# Patient Record
Sex: FEMALE | Race: WHITE | Employment: FULL TIME | URBAN - METROPOLITAN AREA
[De-identification: names, ages, dates, MRNs, and addresses within clinical notes are randomized per-mention and may not be internally consistent; named-entity substitution may affect disease eponyms.]

---

## 2024-01-23 ENCOUNTER — OFFICE VISIT (OUTPATIENT)
Dept: OBGYN CLINIC | Facility: CLINIC | Age: 29
End: 2024-01-23
Payer: COMMERCIAL

## 2024-01-23 VITALS
BODY MASS INDEX: 31.18 KG/M2 | DIASTOLIC BLOOD PRESSURE: 62 MMHG | SYSTOLIC BLOOD PRESSURE: 112 MMHG | HEIGHT: 67 IN | WEIGHT: 198.63 LBS

## 2024-01-23 DIAGNOSIS — Z01.419 WOMEN'S ANNUAL ROUTINE GYNECOLOGICAL EXAMINATION: ICD-10-CM

## 2024-01-23 DIAGNOSIS — N94.6 DYSMENORRHEA: ICD-10-CM

## 2024-01-23 DIAGNOSIS — R68.82 LOW LIBIDO: ICD-10-CM

## 2024-01-23 DIAGNOSIS — Z12.4 ROUTINE CERVICAL SMEAR: Primary | ICD-10-CM

## 2024-01-23 PROCEDURE — 99385 PREV VISIT NEW AGE 18-39: CPT | Performed by: OBSTETRICS & GYNECOLOGY

## 2024-01-23 PROCEDURE — 88175 CYTOPATH C/V AUTO FLUID REDO: CPT | Performed by: OBSTETRICS & GYNECOLOGY

## 2024-01-23 PROCEDURE — 3074F SYST BP LT 130 MM HG: CPT | Performed by: OBSTETRICS & GYNECOLOGY

## 2024-01-23 PROCEDURE — 3078F DIAST BP <80 MM HG: CPT | Performed by: OBSTETRICS & GYNECOLOGY

## 2024-01-23 PROCEDURE — 3008F BODY MASS INDEX DOCD: CPT | Performed by: OBSTETRICS & GYNECOLOGY

## 2024-01-23 PROCEDURE — 99213 OFFICE O/P EST LOW 20 MIN: CPT | Performed by: OBSTETRICS & GYNECOLOGY

## 2024-01-23 RX ORDER — DULOXETIN HYDROCHLORIDE 60 MG/1
CAPSULE, DELAYED RELEASE ORAL
COMMUNITY
Start: 2024-01-03

## 2024-01-23 RX ORDER — PREGABALIN 50 MG/1
CAPSULE ORAL
COMMUNITY
Start: 2024-01-11

## 2024-01-23 NOTE — PROGRESS NOTES
NEW GYN H&P     2024  11:06 AM    Chief Complaint   Patient presents with    New Patient     Annual exam/pap    Other     Pt c/o low sex drive, pain with periods   .    HPI: Patient is a 28 year old  LMP 23  here to establish care - due for annual exam and PAP. Reports longstanding painful periods as well as persistent decreased libido. Advised pelvic USN to assess uterine architecture and counseled on use of sreekanth-menstrual Aleve 2d before and during first 2 days of every cycle for management of painful cramps. Reports being on Duloxatine for fibromyalgia - counseled that SSRI medications can impact libido and advised to discuss alternatives with prescribing doctor. In the meantime, recommended libido support with L-arginine supplement ArginMax for women. No other gynecologic concerns or complaints.No pelvic pain. No abnormal vaginal discharge or bleeding.       Patient's last menstrual period was 2023 (exact date).    OB History    Para Term  AB Living   0 0 0 0 0 0   SAB IAB Ectopic Multiple Live Births   0 0 0 0 0       GYN hx:    Hx Prior Abnormal Pap: No  Pap Result Notes: per pt pap done in the past  CONTRACEPTION: None  LAST MAMMOGRAM: NA      Current Outpatient Medications   Medication Sig Dispense Refill    pregabalin 50 MG Oral Cap       DULoxetine 60 MG Oral Cap DR Particles          Past Medical History:   Diagnosis Date    Asthma     childhood    Fibromyalgia      Past Surgical History:   Procedure Laterality Date    INSERT INTRAUTERINE DEVICE      Paragard iud    REMOVE INTRAUTERINE DEVICE      paragard removed     No Known Allergies  Family History   Problem Relation Age of Onset    Heart Disease Father     Fibromyalgia Mother     Colon Cancer Neg     Uterine Cancer Neg     Ovarian Cancer Neg     Breast Cancer Neg      Social History     Socioeconomic History    Marital status:    Tobacco Use    Smoking status: Never    Smokeless tobacco: Never    Substance and Sexual Activity    Alcohol use: Never    Drug use: Never    Sexual activity: Yes     Partners: Male     Social History     Social History Narrative    Not on file       ROS:     Review of Systems:  A comprehensive 10 point ROS was completed. All pertinent positives and negatives noted in the HPI.     /62   Ht 67\"   Wt 198 lb 9.6 oz (90.1 kg)   LMP 12/31/2023 (Exact Date)   BMI 31.11 kg/m²     Exam:   GENERAL: well developed, well nourished, in no apparent distress  SKIN: no rashes, no lesions  HEENT: normal  LUNGS: respiration unlabored  CARDIOVASCULAR: no peripheral edema or varicosities, skin warm and dry  BREASTS: bilaterally nontender, no palpable masses, no nipple discharge, no skin changes, no axillary adenopathy  ABDOMEN: Soft, non distended; non tender, no masses  GYNE/:   External Genitalia: normal, no lesions, good perineal support  Urethra: meatus normal   Bladder: well supported  Vagina: normal mucosa, no lesions, no discharge   Uterus: normal size, mobile, nontender  Cervix: normal os, no lesions or bleeding  Adnexa:normal size, bilaterally nontender, no palpable masses  Cul-de-sac: normal  R/V: normal perineum, no hemorrhoids  EXTREMITIES:  nontender without edema      A/P: Patient is 28 year old female     1. Women's annual routine gynecological examination  - PAP today    2. Dysmenorrhea  - Pelvic USN ordered  - Start taking Aleve 2d prior + first 2d every cycle    3. Low libido  - Start L-arginine supplement ArginMax for Women  - Discuss adjustment of SSRI treatment of fibromyalgia      Total time spent = 30 minutes  >50% visit = face to face counseling and coordination of care        1/23/2024  Mariangel Villanueva MD

## 2024-01-31 ENCOUNTER — HOSPITAL ENCOUNTER (OUTPATIENT)
Dept: ULTRASOUND IMAGING | Age: 29
Discharge: HOME | End: 2024-01-31
Attending: OBSTETRICS & GYNECOLOGY
Payer: COMMERCIAL

## 2024-01-31 DIAGNOSIS — N94.6 DYSMENORRHEA: ICD-10-CM

## 2024-01-31 PROCEDURE — 76830 TRANSVAGINAL US NON-OB: CPT | Performed by: OBSTETRICS & GYNECOLOGY

## 2024-01-31 PROCEDURE — 76856 US EXAM PELVIC COMPLETE: CPT | Performed by: OBSTETRICS & GYNECOLOGY

## 2024-02-02 NOTE — PROGRESS NOTES
Released to [employer] and message from provider/results was viewed by patient.    Seen by patient Sonia Fields on 1/31/2024  2:38 PM

## 2024-02-12 ENCOUNTER — E-VISIT (OUTPATIENT)
Dept: TELEHEALTH | Age: 29
End: 2024-02-12
Payer: COMMERCIAL

## 2024-02-12 DIAGNOSIS — J01.90 ACUTE SINUSITIS WITH SYMPTOMS > 10 DAYS: Primary | ICD-10-CM

## 2024-02-12 RX ORDER — AMOXICILLIN AND CLAVULANATE POTASSIUM 875; 125 MG/1; MG/1
1 TABLET, FILM COATED ORAL 2 TIMES DAILY
Qty: 20 TABLET | Refills: 0 | Status: SHIPPED | OUTPATIENT
Start: 2024-02-12 | End: 2024-02-22

## 2024-02-12 NOTE — PROGRESS NOTES
Sonia Fields is a 29 year old female.  HPI:   See answers to questions above. See MCM    Current Outpatient Medications   Medication Sig Dispense Refill    amoxicillin clavulanate 875-125 MG Oral Tab Take 1 tablet by mouth 2 (two) times daily for 10 days. 20 tablet 0    pregabalin 50 MG Oral Cap       DULoxetine 60 MG Oral Cap DR Particles         Past Medical History:   Diagnosis Date    Asthma     childhood    Fibromyalgia       Past Surgical History:   Procedure Laterality Date    INSERT INTRAUTERINE DEVICE  2021    Paragard iud    REMOVE INTRAUTERINE DEVICE  2021    paragard removed      Family History   Problem Relation Age of Onset    Heart Disease Father     Fibromyalgia Mother     Colon Cancer Neg     Uterine Cancer Neg     Ovarian Cancer Neg     Breast Cancer Neg       Social History:  Social History     Socioeconomic History    Marital status:    Tobacco Use    Smoking status: Never    Smokeless tobacco: Never   Substance and Sexual Activity    Alcohol use: Never    Drug use: Never    Sexual activity: Yes     Partners: Male         ASSESSMENT AND PLAN:   Total time 10 minutes

## 2024-04-08 ENCOUNTER — E-VISIT (OUTPATIENT)
Dept: TELEHEALTH | Age: 29
End: 2024-04-08

## 2024-04-08 DIAGNOSIS — J01.00 ACUTE NON-RECURRENT MAXILLARY SINUSITIS: Primary | ICD-10-CM

## 2024-04-08 RX ORDER — AMOXICILLIN AND CLAVULANATE POTASSIUM 875; 125 MG/1; MG/1
1 TABLET, FILM COATED ORAL 2 TIMES DAILY
Qty: 14 TABLET | Refills: 0 | Status: SHIPPED | OUTPATIENT
Start: 2024-04-10 | End: 2024-04-17

## 2024-04-09 NOTE — PROGRESS NOTES
HPI:  Sonia Fields is a 29 year old female who presents for an evisit.  See [employer] communications above.    Current Outpatient Medications   Medication Sig Dispense Refill    [START ON 4/10/2024] amoxicillin clavulanate 875-125 MG Oral Tab Take 1 tablet by mouth 2 (two) times daily for 7 days. 14 tablet 0    pregabalin 50 MG Oral Cap       DULoxetine 60 MG Oral Cap DR Particles        Past Medical History:   Diagnosis Date    Asthma (HCC)     childhood    Fibromyalgia      Past Surgical History:   Procedure Laterality Date    INSERT INTRAUTERINE DEVICE  2021    Paragard iud    REMOVE INTRAUTERINE DEVICE  2021    paragard removed       Social History     Socioeconomic History    Marital status:    Tobacco Use    Smoking status: Never    Smokeless tobacco: Never   Substance and Sexual Activity    Alcohol use: Never    Drug use: Never    Sexual activity: Yes     Partners: Male          No results found for this or any previous visit (from the past 24 hour(s)).    ASSESSMENT AND PLAN:      ASSESSMENT:   Encounter Diagnosis   Name Primary?    Acute non-recurrent maxillary sinusitis Yes       PLAN: Meds as below.  See patient Instructions  -Total of 19 minutes spent with patient.    Meds & Refills for this Visit:  Requested Prescriptions     Signed Prescriptions Disp Refills    amoxicillin clavulanate 875-125 MG Oral Tab 14 tablet 0     Sig: Take 1 tablet by mouth 2 (two) times daily for 7 days.       Risks, benefits, and side effects of medication explained and discussed.    Patient Instructions   -Push fluids  -Cool mist humidifier  -Tea with honey  -Tylenol/motrin as needed  -Must be seen with worsening symptoms        The patient indicates understanding of these issues and agrees to the plan.  See attached patient references.  The patient is asked to return if sx's persist or worsen.    Sonia Fields understands evisit evaluation is not a substitute for face-to-face examination or emergency care. Patient  advised to go to ER or call 911 for worsening symptoms or acute distress.

## 2024-04-09 NOTE — PATIENT INSTRUCTIONS
-Push fluids  -Cool mist humidifier  -Tea with honey  -Tylenol/motrin as needed  -Must be seen with worsening symptoms

## 2024-04-10 ENCOUNTER — E-VISIT (OUTPATIENT)
Dept: TELEHEALTH | Age: 29
End: 2024-04-10
Payer: COMMERCIAL

## 2024-04-10 DIAGNOSIS — J01.00 ACUTE NON-RECURRENT MAXILLARY SINUSITIS: Primary | ICD-10-CM

## 2024-04-10 PROCEDURE — 99421 OL DIG E/M SVC 5-10 MIN: CPT | Performed by: PHYSICIAN ASSISTANT

## 2024-04-10 NOTE — PROGRESS NOTES
Sonia Fields is a 29 year old female.  HPI:   See answers to questions above.     Current Outpatient Medications   Medication Sig Dispense Refill    amoxicillin clavulanate 875-125 MG Oral Tab Take 1 tablet by mouth 2 (two) times daily for 7 days. 14 tablet 0    pregabalin 50 MG Oral Cap       DULoxetine 60 MG Oral Cap DR Particles         Past Medical History:    Asthma (HCC)    childhood    Fibromyalgia      Past Surgical History:   Procedure Laterality Date    Insert intrauterine device  2021    Paragard iud    Remove intrauterine device  2021    paragard removed      Family History   Problem Relation Age of Onset    Heart Disease Father     Fibromyalgia Mother     Colon Cancer Neg     Uterine Cancer Neg     Ovarian Cancer Neg     Breast Cancer Neg       Social History:  Social History     Socioeconomic History    Marital status:    Tobacco Use    Smoking status: Never    Smokeless tobacco: Never   Substance and Sexual Activity    Alcohol use: Never    Drug use: Never    Sexual activity: Yes     Partners: Male         ASSESSMENT AND PLAN:     Encounter Diagnosis   Name Primary?    Acute non-recurrent maxillary sinusitis Yes     Pt had WI visit 2 days ago. Confirmed with pt that prescription was sent and is available for pickup at pharmacy.      Meds & Refills for this Visit:  Requested Prescriptions      No prescriptions requested or ordered in this encounter       Duration of  the service:  5 minutes

## 2024-06-17 ENCOUNTER — E-VISIT (OUTPATIENT)
Dept: TELEHEALTH | Age: 29
End: 2024-06-17
Payer: COMMERCIAL

## 2024-06-17 DIAGNOSIS — R39.9 UTI SYMPTOMS: Primary | ICD-10-CM

## 2024-06-18 NOTE — PROGRESS NOTES
HPI:  Sonia Fields is a 29 year old female who presents for an evisit.  See [employer] above.    Current Outpatient Medications   Medication Sig Dispense Refill    pregabalin 50 MG Oral Cap       DULoxetine 60 MG Oral Cap DR Particles        Past Medical History:    Asthma (HCC)    childhood    Fibromyalgia     Past Surgical History:   Procedure Laterality Date    Insert intrauterine device  2021    Paragard iud    Remove intrauterine device  2021    paragard removed       Social History     Socioeconomic History    Marital status:    Tobacco Use    Smoking status: Never    Smokeless tobacco: Never   Substance and Sexual Activity    Alcohol use: Never    Drug use: Never    Sexual activity: Yes     Partners: Male          No results found for this or any previous visit (from the past 24 hour(s)).    ASSESSMENT AND PLAN:      ASSESSMENT:   Encounter Diagnosis   Name Primary?    UTI symptoms Yes       PLAN: Meds as below.  See patient Instructions  -Patient with abdominal pain with UTI sxs.  In person exam advised.  5 minutes spent with patient.    Meds & Refills for this Visit:  Requested Prescriptions      No prescriptions requested or ordered in this encounter       Risks, benefits, and side effects of medication explained and discussed.    There are no Patient Instructions on file for this visit.    The patient indicates understanding of these issues and agrees to the plan.  See attached patient references.  The patient is asked to return if sx's persist or worsen.    Sonia Fields understands evisit evaluation is not a substitute for face-to-face examination or emergency care. Patient advised to go to ER or call 911 for worsening symptoms or acute distress.

## 2024-06-23 ENCOUNTER — HOSPITAL ENCOUNTER (OUTPATIENT)
Age: 29
Discharge: HOME | End: 2024-06-23

## 2024-06-23 VITALS
TEMPERATURE: 98 F | OXYGEN SATURATION: 97 % | HEART RATE: 83 BPM | DIASTOLIC BLOOD PRESSURE: 71 MMHG | RESPIRATION RATE: 22 BRPM | SYSTOLIC BLOOD PRESSURE: 116 MMHG

## 2024-06-23 DIAGNOSIS — N30.00 ACUTE CYSTITIS WITHOUT HEMATURIA: Primary | ICD-10-CM

## 2024-06-23 LAB
B-HCG UR QL: NEGATIVE
BILIRUB UR QL STRIP: NEGATIVE
COLOR UR: YELLOW
GLUCOSE UR STRIP-MCNC: NEGATIVE MG/DL
KETONES UR STRIP-MCNC: NEGATIVE MG/DL
NITRITE UR QL STRIP: NEGATIVE
PH UR STRIP: 7 [PH]
PROT UR STRIP-MCNC: NEGATIVE MG/DL
SP GR UR STRIP: 1.02
UROBILINOGEN UR STRIP-ACNC: <2 MG/DL

## 2024-06-23 PROCEDURE — 81025 URINE PREGNANCY TEST: CPT | Performed by: NURSE PRACTITIONER

## 2024-06-23 PROCEDURE — 99214 OFFICE O/P EST MOD 30 MIN: CPT | Performed by: NURSE PRACTITIONER

## 2024-06-23 PROCEDURE — 81002 URINALYSIS NONAUTO W/O SCOPE: CPT | Performed by: NURSE PRACTITIONER

## 2024-06-23 RX ORDER — NITROFURANTOIN 25; 75 MG/1; MG/1
100 CAPSULE ORAL 2 TIMES DAILY
Qty: 10 CAPSULE | Refills: 0 | Status: SHIPPED | OUTPATIENT
Start: 2024-06-23 | End: 2024-06-28

## 2024-06-23 RX ORDER — PHENAZOPYRIDINE HYDROCHLORIDE 200 MG/1
200 TABLET, FILM COATED ORAL 3 TIMES DAILY PRN
Qty: 6 TABLET | Refills: 0 | Status: SHIPPED | OUTPATIENT
Start: 2024-06-23 | End: 2024-06-25

## 2024-06-23 NOTE — ED PROVIDER NOTES
Patient Seen in: [facility]      History     Chief Complaint   Patient presents with    Urinary Symptoms     Stated Complaint: Urinary Symptoms    Subjective:   28 y/o female with unremarkable medical history presents with c/o dysuria, urinary frequency and urgency onset 9 to 10 days ago.  Had a visit with her PCP 6/17/2024 because she was out of town and cannot find an immediate care that took her insurance.  It was prescribed Bactrim for 3 days.  States took entire course and felt better but then on Friday symptoms returned and have been slightly getting worse.  No fever/chills, hematuria, abdominal pain, nausea/vomiting, flank pain, vaginal bleeding or discharge.            Objective:   Past Medical History:    Asthma (HCC)    childhood    Fibromyalgia              Past Surgical History:   Procedure Laterality Date    Insert intrauterine device  2021    Paragard iud    Remove intrauterine device  2021    paragard removed                Social History     Socioeconomic History    Marital status:    Tobacco Use    Smoking status: Never    Smokeless tobacco: Never   Substance and Sexual Activity    Alcohol use: Never    Drug use: Never    Sexual activity: Yes     Partners: Male              Review of Systems   Constitutional:  Negative for chills and fever.   Gastrointestinal:  Negative for abdominal pain, nausea and vomiting.   Genitourinary:  Positive for dysuria, frequency and urgency. Negative for flank pain, hematuria, vaginal discharge and vaginal pain.   All other systems reviewed and are negative.      Positive for stated complaint: Urinary Symptoms  Other systems are as noted in HPI.  Constitutional and vital signs reviewed.      All other systems reviewed and negative except as noted above.    Physical Exam     ED Triage Vitals [06/23/24 1126]   /71   Pulse 83   Resp 22   Temp 98.1 °F (36.7 °C)   Temp src Temporal   SpO2 97 %   O2 Device None (Room air)       Current Vitals:    Vital Signs  BP: 116/71  Pulse: 83  Resp: 22  Temp: 98.1 °F (36.7 °C)  Temp src: Temporal    Oxygen Therapy  SpO2: 97 %  O2 Device: None (Room air)            Physical Exam  Vitals and nursing note reviewed.   Constitutional:       Appearance: Normal appearance.   HENT:      Head: Normocephalic.   Cardiovascular:      Rate and Rhythm: Normal rate and regular rhythm.   Pulmonary:      Effort: Pulmonary effort is normal.      Breath sounds: Normal breath sounds.   Abdominal:      General: Bowel sounds are normal.      Tenderness: There is no abdominal tenderness. There is no right CVA tenderness or left CVA tenderness.   Skin:     General: Skin is warm and dry.      Capillary Refill: Capillary refill takes less than 2 seconds.   Neurological:      Mental Status: She is alert and oriented to person, place, and time.               ED Course     Labs Reviewed   [facility] POCT URINALYSIS DIPSTICK - Abnormal; Notable for the following components:       Result Value    Urine Clarity Cloudy (*)     Blood, Urine Small (*)     Leukocyte esterase urine Large (*)     All other components within normal limits   POCT PREGNANCY URINE - Normal   URINE CULTURE, ROUTINE                      MDM                                         Medical Decision Making  Patient is well-appearing. In NAD  Abdomen benign.  No CVA tenderness present on exam.  Urine clarity was cloudy, blood and leukocytes esterase were present in urine dip.  Urine culture pending.    Rx Macrobid, Pyridium.    I discussed differentials with patient including but not limited to OAB vs UTI vs pyelonephritis vs renal calculi.     Push fluids  F/U with PCP. Return/ED precautions discussed.      Problems Addressed:  Acute cystitis without hematuria: acute illness or injury    Amount and/or Complexity of Data Reviewed  Labs: ordered. Decision-making details documented in ED Course.        Disposition and Plan     Clinical Impression:  1. Acute cystitis without hematuria          Disposition:  Discharge  6/23/2024 11:52 am    Follow-up:  Mariangel Villanueva MD  [address] [address] [phone]                Medications Prescribed:  Discharge Medication List as of 6/23/2024 11:53 AM        START taking these medications    Details   nitrofurantoin monohydrate macro 100 MG Oral Cap Take 1 capsule (100 mg total) by mouth 2 (two) times daily for 5 days., Normal, Disp-10 capsule, R-0      phenazopyridine 200 MG Oral Tab Take 1 tablet (200 mg total) by mouth 3 (three) times daily as needed for Pain., Normal, Disp-6 tablet, R-0

## 2024-06-23 NOTE — ED INITIAL ASSESSMENT (HPI)
Pt started having urinary urgency, frequency and burning 9-10 days ago and had an e-visit on 6/17/24 and given bactrim.  P did feel better for a few days. Symptoms restarted on Friday night and have been getting worse.  Denies fevers or flank pain.

## 2024-06-26 NOTE — ED NOTES
Pt returned call and updated regarding negative urine culture and understood that she can discontinue antibiotics.

## 2024-08-28 ENCOUNTER — OFFICE VISIT (OUTPATIENT)
Dept: FAMILY MEDICINE CLINIC | Facility: CLINIC | Age: 29
End: 2024-08-28
Payer: COMMERCIAL

## 2024-08-28 ENCOUNTER — LAB ENCOUNTER (OUTPATIENT)
Dept: LAB | Age: 29
End: 2024-08-28
Attending: FAMILY MEDICINE
Payer: COMMERCIAL

## 2024-08-28 VITALS
HEART RATE: 83 BPM | BODY MASS INDEX: 31.6 KG/M2 | TEMPERATURE: 98 F | SYSTOLIC BLOOD PRESSURE: 113 MMHG | WEIGHT: 199 LBS | RESPIRATION RATE: 18 BRPM | DIASTOLIC BLOOD PRESSURE: 68 MMHG | OXYGEN SATURATION: 96 % | HEIGHT: 66.5 IN

## 2024-08-28 DIAGNOSIS — Z00.00 ROUTINE HEALTH MAINTENANCE: Primary | ICD-10-CM

## 2024-08-28 DIAGNOSIS — Z00.00 ROUTINE HEALTH MAINTENANCE: ICD-10-CM

## 2024-08-28 LAB
ALBUMIN SERPL-MCNC: 4.5 G/DL (ref 3.2–4.8)
ALBUMIN/GLOB SERPL: 1.5 {RATIO} (ref 1–2)
ALP LIVER SERPL-CCNC: 97 U/L
ALT SERPL-CCNC: 25 U/L
ANION GAP SERPL CALC-SCNC: 8 MMOL/L (ref 0–18)
AST SERPL-CCNC: 26 U/L (ref ?–34)
BASOPHILS # BLD AUTO: 0.03 X10(3) UL (ref 0–0.2)
BASOPHILS NFR BLD AUTO: 0.4 %
BILIRUB SERPL-MCNC: 0.4 MG/DL (ref 0.3–1.2)
BUN BLD-MCNC: 12 MG/DL (ref 9–23)
BUN/CREAT SERPL: 16.4 (ref 10–20)
CALCIUM BLD-MCNC: 9.6 MG/DL (ref 8.7–10.4)
CHLORIDE SERPL-SCNC: 109 MMOL/L (ref 98–112)
CHOLEST SERPL-MCNC: 196 MG/DL (ref ?–200)
CO2 SERPL-SCNC: 23 MMOL/L (ref 21–32)
CREAT BLD-MCNC: 0.73 MG/DL
DEPRECATED RDW RBC AUTO: 40.6 FL (ref 35.1–46.3)
EGFRCR SERPLBLD CKD-EPI 2021: 116 ML/MIN/1.73M2 (ref 60–?)
EOSINOPHIL # BLD AUTO: 0.06 X10(3) UL (ref 0–0.7)
EOSINOPHIL NFR BLD AUTO: 0.8 %
ERYTHROCYTE [DISTWIDTH] IN BLOOD BY AUTOMATED COUNT: 12.4 % (ref 11–15)
FASTING PATIENT LIPID ANSWER: NO
FASTING STATUS PATIENT QL REPORTED: NO
GLOBULIN PLAS-MCNC: 3.1 G/DL (ref 2–3.5)
GLUCOSE BLD-MCNC: 86 MG/DL (ref 70–99)
HCT VFR BLD AUTO: 42.2 %
HDLC SERPL-MCNC: 59 MG/DL (ref 40–59)
HGB BLD-MCNC: 13.8 G/DL
IMM GRANULOCYTES # BLD AUTO: 0.01 X10(3) UL (ref 0–1)
IMM GRANULOCYTES NFR BLD: 0.1 %
LDLC SERPL CALC-MCNC: 125 MG/DL (ref ?–100)
LYMPHOCYTES # BLD AUTO: 2.48 X10(3) UL (ref 1–4)
LYMPHOCYTES NFR BLD AUTO: 34.1 %
MCH RBC QN AUTO: 29.6 PG (ref 26–34)
MCHC RBC AUTO-ENTMCNC: 32.7 G/DL (ref 31–37)
MCV RBC AUTO: 90.6 FL
MONOCYTES # BLD AUTO: 0.55 X10(3) UL (ref 0.1–1)
MONOCYTES NFR BLD AUTO: 7.6 %
NEUTROPHILS # BLD AUTO: 4.14 X10 (3) UL (ref 1.5–7.7)
NEUTROPHILS # BLD AUTO: 4.14 X10(3) UL (ref 1.5–7.7)
NEUTROPHILS NFR BLD AUTO: 57 %
NONHDLC SERPL-MCNC: 137 MG/DL (ref ?–130)
OSMOLALITY SERPL CALC.SUM OF ELEC: 289 MOSM/KG (ref 275–295)
PLATELET # BLD AUTO: 293 10(3)UL (ref 150–450)
POTASSIUM SERPL-SCNC: 4.1 MMOL/L (ref 3.5–5.1)
PROT SERPL-MCNC: 7.6 G/DL (ref 5.7–8.2)
RBC # BLD AUTO: 4.66 X10(6)UL
SODIUM SERPL-SCNC: 140 MMOL/L (ref 136–145)
TRIGL SERPL-MCNC: 67 MG/DL (ref 30–149)
TSI SER-ACNC: 1.41 MIU/ML (ref 0.55–4.78)
VLDLC SERPL CALC-MCNC: 12 MG/DL (ref 0–30)
WBC # BLD AUTO: 7.3 X10(3) UL (ref 4–11)

## 2024-08-28 PROCEDURE — 36415 COLL VENOUS BLD VENIPUNCTURE: CPT

## 2024-08-28 PROCEDURE — 90471 IMMUNIZATION ADMIN: CPT | Performed by: FAMILY MEDICINE

## 2024-08-28 PROCEDURE — 90715 TDAP VACCINE 7 YRS/> IM: CPT | Performed by: FAMILY MEDICINE

## 2024-08-28 PROCEDURE — 84443 ASSAY THYROID STIM HORMONE: CPT

## 2024-08-28 PROCEDURE — 80053 COMPREHEN METABOLIC PANEL: CPT

## 2024-08-28 PROCEDURE — 80061 LIPID PANEL: CPT

## 2024-08-28 PROCEDURE — 99385 PREV VISIT NEW AGE 18-39: CPT | Performed by: FAMILY MEDICINE

## 2024-08-28 PROCEDURE — 85025 COMPLETE CBC W/AUTO DIFF WBC: CPT

## 2024-08-28 NOTE — PROGRESS NOTES
Sonia Fields is a 29 year old female.  Chief Complaint   Patient presents with    Physical     Pt here as new patient to establish care/physical.        HPI:   Patient is a 29-year-old female presents today to establish care and routine physical exam.  Patient has a history of fibromyalgia that she has managed without medications at this time.    No current outpatient medications on file.      Past Medical History:    Asthma (HCC)    childhood    Fibromyalgia      Past Surgical History:   Procedure Laterality Date    Insert intrauterine device  2021    Paragard iud    Remove intrauterine device  2021    paragard removed      Social History:  Social History     Socioeconomic History    Marital status:    Tobacco Use    Smoking status: Never    Smokeless tobacco: Never   Substance and Sexual Activity    Alcohol use: Never    Drug use: Never    Sexual activity: Yes     Partners: Male        REVIEW OF SYSTEMS:   GENERAL HEALTH: No fevers, chills, sweats, fatigue  VISION: No recent vision problems, blurry vision or double vision  HEENT: No decreased hearing ear pain nasal congestion or sore throat  SKIN: denies any unusual skin lesions or rashes  RESPIRATORY: denies shortness of breath, cough, wheezing  CARDIOVASCULAR: denies chest pain on exertion, palpitations, swelling in feet  GI: denies abdominal pain and denies heartburn, nausea or vomiting  : No Pain on urination, change in the color of urine, discharge, urinating frequently  MUS: No back pain, joint pain, muscle pain  NEURO: denies headaches , anxiety, depression    EXAM:   /68   Pulse 83   Temp 98.3 °F (36.8 °C) (Oral)   Resp 18   Ht 5' 6.5\" (1.689 m)   Wt 199 lb (90.3 kg)   LMP 08/04/2024 (Exact Date)   SpO2 96%   BMI 31.64 kg/m²   GENERAL: well developed, well nourished,in no apparent distress  SKIN: no rashes,no suspicious lesions  HEENT: atraumatic, normocephalic,ears and throat are clear,   NECK: supple,no adenopathy,  LUNGS: clear to  auscultation, no wheeze  CARDIO: RRR without murmur  GI: good BS's,no masses or tenderness  EXTREMITIES: no cyanosis, or edema    ASSESSMENT AND PLAN:   1. Routine health maintenance  We will do labs today.  Discussed diet and exercise with patient.  - CBC W Differential W Platelet [E]; Future  - Comp Metabolic Panel (14) [E]; Future  - Lipid Panel [E]; Future  - TSH [E]; Future       The patient indicates understanding of these issues and agrees to the plan.  No follow-ups on file.

## 2024-12-09 ENCOUNTER — HOSPITAL ENCOUNTER (OUTPATIENT)
Age: 29
Discharge: HOME | End: 2024-12-09
Payer: COMMERCIAL

## 2024-12-09 VITALS
OXYGEN SATURATION: 99 % | DIASTOLIC BLOOD PRESSURE: 72 MMHG | SYSTOLIC BLOOD PRESSURE: 122 MMHG | TEMPERATURE: 98 F | HEART RATE: 64 BPM | RESPIRATION RATE: 20 BRPM

## 2024-12-09 DIAGNOSIS — R30.0 DYSURIA: Primary | ICD-10-CM

## 2024-12-09 LAB
B-HCG UR QL: NEGATIVE
BILIRUB UR QL STRIP: NEGATIVE
GLUCOSE UR STRIP-MCNC: NEGATIVE MG/DL
KETONES UR STRIP-MCNC: NEGATIVE MG/DL
LEUKOCYTE ESTERASE UR QL STRIP: NEGATIVE
NITRITE UR QL STRIP: NEGATIVE
PH UR STRIP: 6 [PH]
PROT UR STRIP-MCNC: NEGATIVE MG/DL
SP GR UR STRIP: 1.02
UROBILINOGEN UR STRIP-ACNC: <2 MG/DL

## 2024-12-09 PROCEDURE — 81025 URINE PREGNANCY TEST: CPT | Performed by: NURSE PRACTITIONER

## 2024-12-09 PROCEDURE — 81002 URINALYSIS NONAUTO W/O SCOPE: CPT | Performed by: NURSE PRACTITIONER

## 2024-12-09 PROCEDURE — 99213 OFFICE O/P EST LOW 20 MIN: CPT | Performed by: NURSE PRACTITIONER

## 2024-12-10 NOTE — ED PROVIDER NOTES
Patient Seen in: [facility]      History     Chief Complaint   Patient presents with    Urinary Symptoms     Stated Complaint: UTI    Subjective:   Well appearing 29 year old female with fibromyalgia and a history of childhood asthma presents with complaints of pain with urination and urinary urgency and frequency since yesterday.  Patient denies abnormal vaginal discharge or bleeding.  Patient communicates that she is on her last days of her menstrual period.  Patient denies STI concerns.              Objective:     Past Medical History:    Asthma (HCC)    childhood    Fibromyalgia              Past Surgical History:   Procedure Laterality Date    Insert intrauterine device  2021    Paragard iud    Remove intrauterine device  2021    paragard removed                Social History     Socioeconomic History    Marital status:    Tobacco Use    Smoking status: Never    Smokeless tobacco: Never   Substance and Sexual Activity    Alcohol use: Never    Drug use: Never    Sexual activity: Yes     Partners: Male              Review of Systems    Positive for stated complaint: UTI  Other systems are as noted in HPI.  Constitutional and vital signs reviewed.      All other systems reviewed and negative except as noted above.    Physical Exam     ED Triage Vitals [12/09/24 1812]   /72   Pulse 64   Resp 20   Temp 98.4 °F (36.9 °C)   Temp src Oral   SpO2 99 %   O2 Device None (Room air)       Current Vitals:   Vital Signs  BP: 122/72  Pulse: 64  Resp: 20  Temp: 98.4 °F (36.9 °C)  Temp src: Oral    Oxygen Therapy  SpO2: 99 %  O2 Device: None (Room air)        Physical Exam  VS: Vital signs reviewed. 02 saturation within normal limits for this patient.    General: Patient is awake and alert, oriented to person, place and time. Pt appears non-toxic.     HEENT: Head is normocephalic, atraumatic. Nonicteric sclera, no conjunctival injection. No facial droop or slurred speech. No oral lesions or pallor.  Mucous membranes moist.     Neck: Supple. Normal ROM.    Lungs: Good inspiratory effort. No accessory muscle use or tachypnea.    Abdomen: Soft, nontender, non-distended.  There is no right CVA tenderness.  There is no left CVA tenderness.    Back: Normal inspection, no tenderness.     Extremities: No focal swelling or tenderness. Capillary refill noted.      Skin: Warm, dry and normal in color.     Psychiatric: Normal affect, judgement normal, insight normal.     CNS: Moves all 4 extremities. Interacts appropriately. No gait abnormality. Memory normal.        ED Course     Labs Reviewed   [facility] POCT URINALYSIS DIPSTICK - Abnormal; Notable for the following components:       Result Value    Urine Clarity Slightly cloudy (*)     Blood, Urine Large (*)     All other components within normal limits   POCT PREGNANCY URINE - Normal   URINE CULTURE, ROUTINE     [facility]     Medical Decision Making  Well-appearing.  Blood present in urine dip, patient communicates that she is currently on her last days of her menstrual period.  Urine dip negative for leukocytes esterase or nitrates.  Urine culture pending.  I discussed over-the-counter AZO for symptoms.  Differential diagnosis discussed with patient included sexually transmitted infection versus overactive bladder versus acute cystitis.   PMD follow-up as well as return precautions discussed.    Problems Addressed:  Dysuria: acute illness or injury    Amount and/or Complexity of Data Reviewed  Labs: ordered. Decision-making details documented in ED Course.    Risk  OTC drugs.        Disposition and Plan     Clinical Impression:  1. Dysuria         Disposition:  Discharge  12/9/2024  6:45 pm    Follow-up:  Brook Gallardo MD  [address] [place] [id number]  [phone]    In 1 week  As needed          Medications Prescribed:  There are no discharge medications for this patient.          Supplementary Documentation:

## 2024-12-10 NOTE — ED INITIAL ASSESSMENT (HPI)
Pt came in due to urine frequency, urgency, and painful urination for the past 2 days. Pt has easy non labored respirations.

## 2024-12-11 RX ORDER — NITROFURANTOIN 25; 75 MG/1; MG/1
100 CAPSULE ORAL 2 TIMES DAILY
Qty: 14 CAPSULE | Refills: 0 | Status: SHIPPED | OUTPATIENT
Start: 2024-12-11 | End: 2024-12-18

## 2025-06-18 ENCOUNTER — HOSPITAL ENCOUNTER (OUTPATIENT)
Age: 30
Discharge: HOME | End: 2025-06-18
Payer: COMMERCIAL

## 2025-06-18 VITALS
DIASTOLIC BLOOD PRESSURE: 89 MMHG | HEART RATE: 87 BPM | WEIGHT: 195 LBS | OXYGEN SATURATION: 100 % | SYSTOLIC BLOOD PRESSURE: 142 MMHG | TEMPERATURE: 98 F | BODY MASS INDEX: 30.61 KG/M2 | HEIGHT: 67 IN | RESPIRATION RATE: 16 BRPM

## 2025-06-18 DIAGNOSIS — R05.1 ACUTE COUGH: ICD-10-CM

## 2025-06-18 DIAGNOSIS — J01.40 ACUTE NON-RECURRENT PANSINUSITIS: Primary | ICD-10-CM

## 2025-06-18 DIAGNOSIS — R09.81 SINUS CONGESTION: ICD-10-CM

## 2025-06-18 DIAGNOSIS — J98.01 BRONCHOSPASM: ICD-10-CM

## 2025-06-18 PROCEDURE — 99213 OFFICE O/P EST LOW 20 MIN: CPT | Performed by: EMERGENCY MEDICINE

## 2025-06-18 RX ORDER — ALBUTEROL SULFATE 90 UG/1
INHALANT RESPIRATORY (INHALATION)
Qty: 1 EACH | Refills: 0 | Status: SHIPPED | OUTPATIENT
Start: 2025-06-18

## 2025-06-18 RX ORDER — BENZONATATE 100 MG/1
100 CAPSULE ORAL 3 TIMES DAILY PRN
Qty: 30 CAPSULE | Refills: 0 | Status: SHIPPED | OUTPATIENT
Start: 2025-06-18

## 2025-06-18 RX ORDER — DOXYCYCLINE 100 MG/1
100 CAPSULE ORAL 2 TIMES DAILY
Qty: 20 CAPSULE | Refills: 0 | Status: SHIPPED | OUTPATIENT
Start: 2025-06-18 | End: 2025-06-28

## 2025-06-18 NOTE — ED INITIAL ASSESSMENT (HPI)
Pt presents to the IC with c/o sinus pressure and congestion for the last 1.5 weeks. Pt notes nasal drainage. +productive cough. Covid test x2 were negative. Fever originally, but has resolved.

## 2025-06-18 NOTE — DISCHARGE INSTRUCTIONS
Treating for sinus infection. OTC Mucinex Fast-mas all in one. Do not get extended release.   We are seeing these coughs linger  between 4-6.  Usually the first week is worse, and its the same in the second week.  By the 3rd and 4th week you should start getting better by every third day.  Continue doing cold medication such as Mucinex, or TheraFlu if you have a sore throat.  Albuterol inhaler every 4-6 hours as needed for coughing spasms, and Tessalon Perles as needed for cough you can take Tessalon Perles in between cold medications such as Mucinex, and TheraFlu.  Contact your primary care if your cough is getting worse after another 2 weeks. Avoid milk/cheese products.  This can increase mucus production causing her cough to be worse, and increase inflammation.    Doxycycline antibiotic sent to the pharmacy.  This will not treat your symptoms but will treat the bacterial sinus infection.  You should start feeling better over the next 3 days, but symptoms may linger for the next week.

## 2025-06-18 NOTE — ED PROVIDER NOTES
Patient Seen in: [facility]        History  Chief Complaint   Patient presents with    Cough/URI     Stated Complaint: COUGH EARACHE CONGESTION    Subjective:   HPI          Sonia Fields is a 30 year old female  here for sinus sx getting worse. Cough not better and is coughing a lot at night. Hx of same and feels like sinus infection. No SOB, hemoptysis, long travel, or recent procedures. Otc sudafed with minimal relief. Ear pain (+) no dizziness, unilateral weakness or syncope        Objective:     Past Medical History:    Asthma (HCC)    childhood    Fibromyalgia              Past Surgical History:   Procedure Laterality Date    Insert intrauterine device  2021    Paragard iud    Remove intrauterine device  2021    paragard removed                Social History     Socioeconomic History    Marital status:    Tobacco Use    Smoking status: Never    Smokeless tobacco: Never   Substance and Sexual Activity    Alcohol use: Never    Drug use: Never    Sexual activity: Yes     Partners: Male              Review of Systems    Positive for stated complaint: COUGH EARACHE CONGESTION  Other systems are as noted in HPI.  Constitutional and vital signs reviewed.      All other systems reviewed and negative except as noted above.                  Physical Exam    ED Triage Vitals [06/18/25 1817]   /89   Pulse 87   Resp 16   Temp 98.2 °F (36.8 °C)   Temp src Oral   SpO2 100 %   O2 Device None (Room air)       Current Vitals:   Vital Signs  BP: 142/89  Pulse: 87  Resp: 16  Temp: 98.2 °F (36.8 °C)  Temp src: Oral    Oxygen Therapy  SpO2: 100 %  O2 Device: None (Room air)            Physical Exam  Vitals and nursing note reviewed.   Constitutional:       General: She is not in acute distress.     Appearance: Normal appearance. She is ill-appearing. She is not toxic-appearing.   HENT:      Head: Normocephalic.      Right Ear: Ear canal and external ear normal.      Left Ear: Ear canal and external ear  normal.      Ears:      Comments: Bilateral clear effusions without purulence.  No erythema/injection of TMs.     Nose: Congestion and rhinorrhea (Yellow drainage noted in bilateral nares.  Bilateral turbinate swelling, right > left) present.      Mouth/Throat:      Comments: PND (+)  Eyes:      Pupils: Pupils are equal, round, and reactive to light.   Cardiovascular:      Rate and Rhythm: Normal rate.      Pulses: Normal pulses.   Pulmonary:      Effort: Pulmonary effort is normal. No respiratory distress.   Musculoskeletal:         General: Normal range of motion.      Cervical back: Normal range of motion. No rigidity or tenderness.   Lymphadenopathy:      Cervical: No cervical adenopathy.   Skin:     Capillary Refill: Capillary refill takes less than 2 seconds.      Findings: No erythema or rash.   Neurological:      General: No focal deficit present.      Mental Status: She is alert and oriented to person, place, and time.   Psychiatric:         Mood and Affect: Mood normal.         Behavior: Behavior normal.         Thought Content: Thought content normal.         Judgment: Judgment normal.                 ED Course  Labs Reviewed - No data to display                         MDM            Medical Decision Making    Ddx: URI vs LRI, allergies, reactive, COVID, FLU, RSV, strep, or somatic causes of symptoms    Tx for: Acute sinusitis with bronchospasm.  Prescription sent to pharmacy to take as directed.  Supportive care include but not limited to otc medications if there is no contraindication, cool mist humidifier, and oral hydration.    Avoid dairy if possible; This increases mucus production.  No stridor, No hot muffled speech, and no signs of compromise. Tolerating PO. Neuro wnl.   Reinforced ER precautions, and f/u care as needed. All questions answered, and reassurance given. No acute distress and cleared for home.      Problems Addressed:  Acute cough: acute illness or injury  Acute non-recurrent  pansinusitis: acute illness or injury  Bronchospasm: acute illness or injury  Sinus congestion: acute illness or injury    Amount and/or Complexity of Data Reviewed  External Data Reviewed: notes.    Risk  OTC drugs.  Prescription drug management.        Disposition and Plan     Clinical Impression:  1. Acute non-recurrent pansinusitis    2. Sinus congestion    3. Acute cough    4. Bronchospasm         Disposition:  Discharge  6/18/2025  6:25 pm    Follow-up:  Brook Gallardo MD  [address] [place] [address]  [phone]                Medications Prescribed:  Discharge Medication List as of 6/18/2025  6:28 PM        START taking these medications    Details   Doxycycline Monohydrate 100 MG Oral Cap Take 1 capsule (100 mg total) by mouth 2 (two) times daily for 10 days., Normal, Disp-20 capsule, R-0NPI [id number].  Collaborating physician Denise Garza.      albuterol 108 (90 Base) MCG/ACT Inhalation Aero Soln Inhale 1 puff and hold breath for 10 seconds then exhale.  Wait 1 full minute and repeat for second puff.  Use every 4-6 hours as needed., Normal, Disp-1 each, R-0NPI [id number]. Collaborating MD Denise Garza.      benzonatate 100 MG Oral Cap Take 1 capsule (100 mg total) by mouth 3 (three) times daily as needed for cough., Normal, Disp-30 capsule, R-0NPI [id number].  Collaborating physician Denise Garza.                   Supplementary Documentation:

## (undated) NOTE — MR AVS SNAPSHOT
After Visit Summary   1/23/2024    Sonia Fields   MRN: [id number]           Visit Information     Date & Time  1/23/2024 11:00 AM Provider  Mariangel Villanueva MD [facility] - OB/GYN Dept. Phone  [phone]      Your Vitals Were  Most recent update: 1/23/2024 10:57 AM    BP   112/62    Ht   67\"    Wt   198 lb 9.6 oz    LMP   12/31/2023 (Exact Date)    BMI   31.11 kg/m²         Allergies as of 1/23/2024  Review status set to Review Complete on 1/23/2024   No Known Allergies     Your Current Medications        Dosage    pregabalin 50 MG Oral Cap     DULoxetine 60 MG Oral Cap DR Particles       Diagnoses for This Visit    Routine cervical smear   [733170]  -  Primary  Women's annual routine gynecological examination   [9601443]    Dysmenorrhea   [625.3.ICD-9-CM]    Low libido   [234253]             We Ordered the Following     Normal Orders This Visit    ThinPrep PAP with HPV Reflex Request [[id number] CUSTOM]     ThinPrep PAP with HPV Reflex Request [[id number] CUSTOM]     Future Labs/Procedures Expected by Expires    ThinPrep PAP with HPV Reflex Request [[id number] CUSTOM]  1/23/2024 1/22/2025    US PELVIS (TRANSABDOMINAL AND TRANSVAGINAL) (CPT=76856/42456) [88902 CPT(R)]  1/23/2024 (Approximate) 1/23/2025      Future Appointments        Provider Department    1/31/2024 10:00 AM [address] Ultrasound [facility]      Imaging Scheduling Instructions     Around January 23, 2024   Imaging:   US PELVIS (TRANSABDOMINAL AND TRANSVAGINAL) (CPT=76856/50911)    Instructions: To schedule an appointment for your radiology test please call [facility] at [phone].                    Did you know that [facility] primary care physicians now offer Video Visits through [facility] for adult patients for a variety of conditions such as allergies, back pain and cold symptoms? Skip the drive and waiting room and online chat with a doctor face-to-face  using your web-cam enabled computer or mobile device wherever you are. Video Visits cost $50 and can be paid hassle-free using a credit, debit, or health savings card.  Not active on [employer]? Ask us how to get signed up today!          If you receive a survey from [employer], please take a few minutes to complete it and provide feedback. We strive to deliver the best patient experience and are looking for ways to make improvements. Your feedback will help us do so. For more information on [employer], please visit [email or web address]           No text in SmartText           No text in SmartText